# Patient Record
(demographics unavailable — no encounter records)

---

## 2025-07-08 NOTE — REVIEW OF SYSTEMS
[Abn Vaginal bleeding] : abnormal vaginal bleeding [Pelvic pain] : pelvic pain [Negative] : Psychiatric

## 2025-07-08 NOTE — PHYSICAL EXAM
[Appropriately responsive] : appropriately responsive [Alert] : alert [No Acute Distress] : no acute distress [Soft] : soft [Non-tender] : non-tender [Non-distended] : non-distended [No Lesions] : no lesions [No Mass] : no mass [Oriented x3] : oriented x3 [Examination Of The Breasts] : a normal appearance [No Masses] : no breast masses were palpable [Labia Majora] : normal [Labia Minora] : normal [Moderate] : There was moderate vaginal bleeding [Normal] : normal [Tenderness] : nontender [Uterine Adnexae] : normal  [Adnexa Tenderness On The Left] : tender [___] : [unfilled] cm mass on the left [FreeTextEntry4] : slow ooze from cervix

## 2025-07-08 NOTE — HISTORY OF PRESENT ILLNESS
[FreeTextEntry1] : Pt is a 29 year G0 LMP 7/5 presenting today for annual exam.   Pt reports episode of AUB recently. Had her period 6/15-6/20 then started bleeding again 7/5. Yesterday pt had significantly heavy bleeding, bled through 7 pads by 230pm. Pt went to ED for eval & was told she was not anemic & no evidence of significant abnormalities on exam. Pt reports bleeding is lighter today. Reports left pelvic cramping. Pt does report increased stress recently as they had to put her boyfriends dog down. Pt is also planning her wedding. Reports painful monthly menses prior to this most recent bleed. Denies any significant changes in weight, new medications, or changes in appetite. Denies breast pain, abnormal nipple discharge, abdominal pain, vaginal discharge/irritation, dysuria. Pt is sexually active with her fiance. Pt feels safe & supported in her relationship  TVUS reviewed today: WML 4.26mm, R ovary wnl, L ovary w/ simple cyst 4.4x3.53x4.33cm & nonvascular echogenic structure 1.79x0.71x0.46cm   PHQ9: 3   OB: denies Gyn: h/o ovarian cysts; denies abnormal pap smears, fibroids, STIs PMH: denies PSH: denies FMHx: denies family h/o breast, ovarian, uterine, or colon cancer Meds: none Allx: NKDA Social: social alcohol use, denies tobacco use, denies drug use; getting  sept 20!